# Patient Record
Sex: FEMALE | Race: BLACK OR AFRICAN AMERICAN | NOT HISPANIC OR LATINO | URBAN - METROPOLITAN AREA
[De-identification: names, ages, dates, MRNs, and addresses within clinical notes are randomized per-mention and may not be internally consistent; named-entity substitution may affect disease eponyms.]

---

## 2017-04-10 ENCOUNTER — OUTPATIENT (OUTPATIENT)
Dept: OUTPATIENT SERVICES | Facility: HOSPITAL | Age: 47
LOS: 1 days | Discharge: HOME | End: 2017-04-10

## 2017-06-27 DIAGNOSIS — D64.9 ANEMIA, UNSPECIFIED: ICD-10-CM

## 2017-08-01 ENCOUNTER — OUTPATIENT (OUTPATIENT)
Dept: OUTPATIENT SERVICES | Facility: HOSPITAL | Age: 47
LOS: 1 days | Discharge: HOME | End: 2017-08-01

## 2017-09-15 ENCOUNTER — OUTPATIENT (OUTPATIENT)
Dept: OUTPATIENT SERVICES | Facility: HOSPITAL | Age: 47
LOS: 1 days | Discharge: HOME | End: 2017-09-15

## 2017-09-15 DIAGNOSIS — Z02.9 ENCOUNTER FOR ADMINISTRATIVE EXAMINATIONS, UNSPECIFIED: ICD-10-CM

## 2017-09-15 DIAGNOSIS — Z11.1 ENCOUNTER FOR SCREENING FOR RESPIRATORY TUBERCULOSIS: ICD-10-CM

## 2017-11-07 ENCOUNTER — OUTPATIENT (OUTPATIENT)
Dept: OUTPATIENT SERVICES | Facility: HOSPITAL | Age: 47
LOS: 1 days | Discharge: HOME | End: 2017-11-07

## 2017-11-07 DIAGNOSIS — Z02.9 ENCOUNTER FOR ADMINISTRATIVE EXAMINATIONS, UNSPECIFIED: ICD-10-CM

## 2019-07-05 ENCOUNTER — EMERGENCY (EMERGENCY)
Facility: HOSPITAL | Age: 49
LOS: 0 days | Discharge: HOME | End: 2019-07-05
Admitting: EMERGENCY MEDICINE
Payer: COMMERCIAL

## 2019-07-05 VITALS
OXYGEN SATURATION: 99 % | RESPIRATION RATE: 18 BRPM | HEART RATE: 63 BPM | DIASTOLIC BLOOD PRESSURE: 65 MMHG | SYSTOLIC BLOOD PRESSURE: 125 MMHG | TEMPERATURE: 98 F

## 2019-07-05 VITALS — HEIGHT: 65 IN | WEIGHT: 149.91 LBS

## 2019-07-05 DIAGNOSIS — N61.0 MASTITIS WITHOUT ABSCESS: ICD-10-CM

## 2019-07-05 DIAGNOSIS — Z79.899 OTHER LONG TERM (CURRENT) DRUG THERAPY: ICD-10-CM

## 2019-07-05 DIAGNOSIS — N64.4 MASTODYNIA: ICD-10-CM

## 2019-07-05 PROCEDURE — 99283 EMERGENCY DEPT VISIT LOW MDM: CPT

## 2019-07-05 RX ORDER — IBUPROFEN 200 MG
1 TABLET ORAL
Qty: 15 | Refills: 0
Start: 2019-07-05 | End: 2019-07-09

## 2019-07-05 RX ORDER — CEPHALEXIN 500 MG
500 CAPSULE ORAL EVERY 12 HOURS
Refills: 0 | Status: DISCONTINUED | OUTPATIENT
Start: 2019-07-05 | End: 2019-07-05

## 2019-07-05 RX ORDER — CEPHALEXIN 500 MG
1 CAPSULE ORAL
Qty: 28 | Refills: 0
Start: 2019-07-05 | End: 2019-07-11

## 2019-07-05 RX ORDER — IBUPROFEN 200 MG
600 TABLET ORAL ONCE
Refills: 0 | Status: COMPLETED | OUTPATIENT
Start: 2019-07-05 | End: 2019-07-05

## 2019-07-05 RX ADMIN — Medication 500 MILLIGRAM(S): at 11:07

## 2019-07-05 RX ADMIN — Medication 600 MILLIGRAM(S): at 11:07

## 2019-07-05 NOTE — ED PROVIDER NOTE - PHYSICAL EXAMINATION
Gen: Alert, NAD, well appearing  Breast: (Chaperone by MS Shahid) Fibrocystic changes noted to breasts bilaterally, +2-cm cystic mobile structure palpable to 1 o'clock proximal to nipple with surrounding area of erythema, no nipple discharge  Pulm: Bilateral BS, normal resp effort, no wheeze/stridor/retractions  CV: RRR, no M/R/G, +dist pulses  Abd: soft, NT/ND  Neuro: AAOx3

## 2019-07-05 NOTE — ED PROVIDER NOTE - NSFOLLOWUPCLINICS_GEN_ALL_ED_FT
[facility]  Breast  [address], [address]  [place] [address]  Phone: [phone]  Fax:   Follow Up Time: 1-3 Days

## 2019-07-05 NOTE — ED PROVIDER NOTE - OBJECTIVE STATEMENT
Patient is a 48 years old F no pmhx presents to the ED for evaluation of left sided atraumatic breast pain that started yesterday. no fever no chills, LMP: Current, hx breast mammo two years ago and normal, no family history of breast cancer.

## 2019-07-05 NOTE — ED PROVIDER NOTE - CLINICAL SUMMARY MEDICAL DECISION MAKING FREE TEXT BOX
cellulitis of left breast, recommended warm compressed, abx and follow up with breast specialist discussed

## 2019-07-05 NOTE — ED PROVIDER NOTE - NS ED ROS FT
Review of Systems    Constitutional: (-) fever  Cardiovascular: (-) chest pain, (-) syncope  Respiratory: (-) cough, (-) shortness of breath  Gastrointestinal: (-) vomiting, (-) diarrhea  Musculoskeletal: (-) neck pain, (-) back pain, (-) joint pain  Integumentary: (-) rash

## 2019-07-08 PROBLEM — Z78.9 OTHER SPECIFIED HEALTH STATUS: Chronic | Status: ACTIVE | Noted: 2019-07-05

## 2019-07-10 PROBLEM — Z00.00 ENCOUNTER FOR PREVENTIVE HEALTH EXAMINATION: Status: ACTIVE | Noted: 2019-07-10

## 2019-07-22 ENCOUNTER — APPOINTMENT (OUTPATIENT)
Dept: BREAST CENTER | Facility: CLINIC | Age: 49
End: 2019-07-22
Payer: COMMERCIAL

## 2019-07-22 VITALS
TEMPERATURE: 98.6 F | SYSTOLIC BLOOD PRESSURE: 122 MMHG | HEIGHT: 66 IN | WEIGHT: 165 LBS | BODY MASS INDEX: 26.52 KG/M2 | DIASTOLIC BLOOD PRESSURE: 78 MMHG

## 2019-07-22 DIAGNOSIS — Z78.9 OTHER SPECIFIED HEALTH STATUS: ICD-10-CM

## 2019-07-22 DIAGNOSIS — R92.2 INCONCLUSIVE MAMMOGRAM: ICD-10-CM

## 2019-07-22 DIAGNOSIS — N61.1 ABSCESS OF THE BREAST AND NIPPLE: ICD-10-CM

## 2019-07-22 PROCEDURE — 99203 OFFICE O/P NEW LOW 30 MIN: CPT

## 2019-07-23 NOTE — HISTORY OF PRESENT ILLNESS
[FreeTextEntry1] : Quinn is a 48 premenopausal F who presents with a recent history of a left breast abscess/mastitis. \par \par Starting about 2-3 weeks prior, she noticed left breast pain with associated induration and erythema.  She presented to the ED at that time and was prescribed two different antibiotics.  She completed the antibiotics and reports resolution of her symptoms.  She still has occasional pain in her left breast, but denies any induration or erythema.  She denies any fevers or chills. \par \par She otherwise has no breast related complaints at this time.  She denies any breast pain, has not palpated any new palpable masses in either breast and denies any nipple discharge or retraction. She has never had any prior breast biopsies or surgeries and has never had a breast infection before.  She denies any insect bites or trauma to either breast. \par \par She has not had any recent imaging. \par \par HISTORICAL RISK FACTORS: \par -no prior breast biopsies or surgeries \par -no family history of breast or ovarian cancer \par -, age at first live birth was 19 \par -prior IUD, stopped 15 years prior \par

## 2019-07-23 NOTE — ASSESSMENT
[FreeTextEntry1] : Quinn is a 48 premenopausal F with a recent history of left breast mastitis. \par \par Her left breast mastitis has since resolved with a course of antibiotics.  \par On exam, there was no evidence of infections or undrainable abscesses.  She also did not have any suspicious lesions palpated in either breast.  Her last imaging was over two years prior and she cannot remember where she got the imaging performed.  She will be scheduled for a b/l dx mammogram and US.  I will have her follow up in 1 month for a CBE. \par \par She is otherwise at an average risk for breast cancer and should continue with annual screening mammograms. \par \par All of her questions were answered.  She knows to call with any further questions or concerns. \par \par PLAN: \par -b/l dx mammogram and US now\par -f/up in 1 month

## 2019-07-23 NOTE — PHYSICAL EXAM
[Normocephalic] : normocephalic [Atraumatic] : atraumatic [No Supraclavicular Adenopathy] : no supraclavicular adenopathy [No Cervical Adenopathy] : no cervical adenopathy [EOMI] : extra ocular movement intact [No dominant masses] : no dominant masses in right breast  [Examined in the supine and seated position] : examined in the supine and seated position [No dominant masses] : no dominant masses left breast [No Nipple Retraction] : no left nipple retraction [No Nipple Discharge] : no left nipple discharge [No Axillary Lymphadenopathy] : no left axillary lymphadenopathy [Not Tender] : non-tender [Soft] : abdomen soft [No Edema] : no edema [No Rashes] : no rashes [No Ulceration] : no ulceration [de-identified] : b/l nonspecific nodularities present throughout both breasts, but no suspicious masses palpated in either breast  [de-identified] : dense breast tissue in the UOQ, more pronounced on the right breast  [de-identified] : area of induration and erythema was @12-1:00, 3-4 cm FN, no suspicious masses palpated, no residual induration or erythema and drainable abscess collections; otherwise no other suspicious findings.

## 2019-07-23 NOTE — PAST MEDICAL HISTORY
[Menstruating] : The patient is menstruating [Menarche Age ____] : age at menarche was [unfilled] [Total Preg ___] : G[unfilled] [Age At Live Birth ___] : Age at live birth: [unfilled] [Live Births ___] : P[unfilled]  [History of Hormone Replacement Treatment] : has no history of hormone replacement treatment [FreeTextEntry5] : denies [FreeTextEntry6] : denies [FreeTextEntry7] : yes in past  [FreeTextEntry8] : yes x 1 year

## 2019-08-19 ENCOUNTER — FORM ENCOUNTER (OUTPATIENT)
Age: 49
End: 2019-08-19

## 2019-08-20 ENCOUNTER — OUTPATIENT (OUTPATIENT)
Dept: OUTPATIENT SERVICES | Facility: HOSPITAL | Age: 49
LOS: 1 days | Discharge: HOME | End: 2019-08-20
Payer: COMMERCIAL

## 2019-08-20 DIAGNOSIS — N64.53 RETRACTION OF NIPPLE: ICD-10-CM

## 2019-08-20 PROCEDURE — 77062 BREAST TOMOSYNTHESIS BI: CPT | Mod: 26

## 2019-08-20 PROCEDURE — 76641 ULTRASOUND BREAST COMPLETE: CPT | Mod: 26,50

## 2019-08-20 PROCEDURE — 77066 DX MAMMO INCL CAD BI: CPT | Mod: 26

## 2019-08-23 ENCOUNTER — EMERGENCY (EMERGENCY)
Facility: HOSPITAL | Age: 49
LOS: 0 days | Discharge: HOME | End: 2019-08-23
Admitting: EMERGENCY MEDICINE
Payer: COMMERCIAL

## 2019-08-23 VITALS
DIASTOLIC BLOOD PRESSURE: 62 MMHG | OXYGEN SATURATION: 100 % | WEIGHT: 163.36 LBS | TEMPERATURE: 98 F | HEART RATE: 95 BPM | SYSTOLIC BLOOD PRESSURE: 137 MMHG | RESPIRATION RATE: 17 BRPM

## 2019-08-23 DIAGNOSIS — M79.674 PAIN IN RIGHT TOE(S): ICD-10-CM

## 2019-08-23 DIAGNOSIS — M79.675 PAIN IN LEFT TOE(S): ICD-10-CM

## 2019-08-23 DIAGNOSIS — G56.00 CARPAL TUNNEL SYNDROME, UNSPECIFIED UPPER LIMB: ICD-10-CM

## 2019-08-23 PROCEDURE — 99282 EMERGENCY DEPT VISIT SF MDM: CPT

## 2019-08-23 NOTE — ED PROVIDER NOTE - NS ED ROS FT
GEN: (-) fever, (-)chills  NEURO: (-) weakness, (+) paresthesias  MS: (-) back pain, (+) joint pain, (-)myalgias, (-) swelling  SKIN: (-) rashes, (-) new lesions, (+) calluses.   HEME: (-) bleeding, (-) ecchymosis

## 2019-08-23 NOTE — ED PROVIDER NOTE - NSFOLLOWUPINSTRUCTIONS_ED_ALL_ED_FT
Carpal Tunnel Syndrome    WHAT YOU NEED TO KNOW:    What is carpal tunnel syndrome (CTS)? CTS is a condition that causes pressure to build in the carpal tunnel. The carpal tunnel is a small area between bones and tissues in your wrist. Swelling in this area puts pressure on the median nerve. The median nerve controls muscles and feeling in the hand.     What increases my risk for CTS?     Activities that use forceful or repetitive movement of your wrist and hand      A past or current wrist injury      Pregnancy      A health condition, such as diabetes, arthritis, or hypothyroidism       Obesity    What are the signs and symptoms of CTS?     Dull, sharp, or shooting pain in your hand      Numbness, tingling, or a burning feeling in your thumb, first finger, and middle finger      Arm pain that may extend to your shoulder      Weakness in your hand      Swelling in your hand      Not being able to control how your hand moves, or you drop objects    How is CTS diagnosed? Your healthcare provider will examine your hand and arm. He will ask how long you have had symptoms and what makes them worse. You may need any of the following:     Tests may be done to check for pressure on your nerve or to test how well your nerves are working. Your healthcare provider will tap, squeeze, press on, and gently move your wrist in different ways.       X-ray or MRI pictures may be used to look at the bones in your wrist and hand to find the cause of your symptoms. You may be given contrast liquid to help your wrist show up better in the pictures. Tell the healthcare provider if you have ever had an allergic reaction to contrast dye. Do not enter the MRI room with anything metal. Metal can cause serious injury. Tell healthcare providers if you have any metal in or on your body.     How is CTS treated? Your symptoms may get better without treatment. You may need any of the following if your symptoms continue or are severe:    NSAIDs may be recommended to decrease swelling and pain. NSAIDs are available without a doctor's order. Ask your healthcare provider which medicine is right for you and how much to take. Take as directed. NSAIDs can cause stomach bleeding or kidney problems if not taken correctly. If you take blood thinning medicine, always ask your healthcare provider if NSAIDs are safe for you.      Steroid injections may help decrease pain and swelling. Steroids are injected into the carpal tunnel.      Transcutaneous electric nerve stimulation uses mild electrical impulses to help decrease your wrist pain.      Surgery called decompression may be used to take pressure off of the median nerve in your wrist.     How can I manage my symptoms?     Apply ice to your wrist. Ice helps decrease swelling and pain in your wrist. Ice may also help prevent tissue damage. Use an ice pack, or put crushed ice in a plastic bag. Cover the ice pack with a towel. Place it on your wrist for 15 to 20 minutes every hour, or as directed.      Rest your hands. Let your hands rest for a short time between repetitive motions, such as typing. If you feel pain, stop what you are doing and gently massage your wrist and hand.      Get physical and occupational therapy, if directed. Physical therapists will show you ways to exercise and strengthen your wrist. Occupational therapists will show you safe ways to use your wrist while you do your usual activities.       Use a wrist splint as directed. A splint will keep your wrist straight or in a slightly bent position. A wrist splint decreases pressure on the median nerve by letting your wrist rest. You may need to wear the splint for up to 8 weeks. You may need to wear it at night.    When should I seek immediate care?     You suddenly lose feeling in your hand or fingers and you cannot move them.       Your hand suddenly changes color.    When should I contact my healthcare provider?     Your symptoms get worse.      Your hand and fingers are so weak that you cannot grab, squeeze, or lift items.      You have questions or concerns about your condition or care.    CARE AGREEMENT:    You have the right to help plan your care. Learn about your health condition and how it may be treated. Discuss treatment options with your healthcare providers to decide what care you want to receive. You always have the right to refuse treatment.

## 2019-08-23 NOTE — ED PROVIDER NOTE - OBJECTIVE STATEMENT
The pt is a 48y Female with no significant PMH is presenting to ED with toe pain x 1mo. pt states she has had worsening, bilateral, intermittent 5th digit toe pain. worse with wearing shoes, relieved without. associated with calluses to lateral aspects of 5th digit. pt denies trauma, swelling, redness, warmth, rashes, ecchymosis, decreased ROM, paresthesias. pt also endorsing bilateral wrist pain x 2mo associated with intermittent numbness to hands.

## 2019-08-23 NOTE — ED PROVIDER NOTE - CARE PROVIDER_API CALL
Tyler Arce)  Orthopaedic Surgery  [address] [place] [address]  Phone: [phone]  Fax: [phone]  Follow Up Time:     Lito Rubio (DPMARLY)  Surgery  [address] [place] [address]  Phone: [phone]  Fax: [phone]  Follow Up Time:

## 2019-08-23 NOTE — ED PROVIDER NOTE - PHYSICAL EXAMINATION
GEN: Alert & Oriented x 3, No acute distress. Calm, appropriate.  MS: Full ROM of bilateral feet/toes. No tenderness with palpation. Full ROM of bilateral wrists. (+) tinel's sign.   SKIN: calluses noted to lateral aspect of bilateral 5th digits of toes. no erythema, drainage or fluctuance.   NEURO: CN II-XII grossly intact. Strength + sensation intact x 4 extremities.

## 2019-08-23 NOTE — ED PROVIDER NOTE - CARE PROVIDERS DIRECT ADDRESSES
[email or web address]_Unknown

## 2020-10-04 NOTE — ED PROVIDER NOTE - CLINICAL SUMMARY MEDICAL DECISION MAKING FREE TEXT BOX
48y Female with wrist pain w/ (+) tinnels sign likely carpal tunnel will send for ortho follow up. Bilateral 5th digit pain, overlying calluses. no trauma, full ROM. discussed with patient about +/- imaging, pt opting for no xrays and will follow up with podiatry outpatient. I have discussed the discharge plan with the patient. The patient agrees with the plan, as discussed.  The patient understands Emergency Department diagnosis is a preliminary diagnosis often based on limited information and that the patient must adhere to the follow-up plan as discussed.  The patient understands that if the symptoms worsen or if prescribed medications do not have the desired/planned effect that the patient may return to the Emergency Department at any time for further evaluation and treatment. NAUSEA/PAIN/ABDOMINAL PAIN

## 2020-11-08 ENCOUNTER — EMERGENCY (EMERGENCY)
Facility: HOSPITAL | Age: 50
LOS: 0 days | Discharge: HOME | End: 2020-11-08
Attending: STUDENT IN AN ORGANIZED HEALTH CARE EDUCATION/TRAINING PROGRAM | Admitting: STUDENT IN AN ORGANIZED HEALTH CARE EDUCATION/TRAINING PROGRAM
Payer: COMMERCIAL

## 2020-11-08 VITALS
SYSTOLIC BLOOD PRESSURE: 133 MMHG | TEMPERATURE: 98 F | OXYGEN SATURATION: 100 % | DIASTOLIC BLOOD PRESSURE: 70 MMHG | RESPIRATION RATE: 18 BRPM | WEIGHT: 147.93 LBS | HEIGHT: 65 IN | HEART RATE: 60 BPM
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
## 2020-11-08 DIAGNOSIS — M25.519 PAIN IN UNSPECIFIED SHOULDER: ICD-10-CM

## 2020-11-08 DIAGNOSIS — Z79.2 LONG TERM (CURRENT) USE OF ANTIBIOTICS: ICD-10-CM

## 2020-11-08 DIAGNOSIS — Z79.1 LONG TERM (CURRENT) USE OF NON-STEROIDAL ANTI-INFLAMMATORIES (NSAID): ICD-10-CM
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
## 2020-11-08 DIAGNOSIS — M25.511 PAIN IN RIGHT SHOULDER: ICD-10-CM

## 2020-11-08 PROCEDURE — 99284 EMERGENCY DEPT VISIT MOD MDM: CPT

## 2020-11-08 PROCEDURE — 73030 X-RAY EXAM OF SHOULDER: CPT | Mod: 26,RT

## 2020-11-08 RX ORDER — IBUPROFEN 200 MG
600 TABLET ORAL ONCE
Refills: 0 | Status: COMPLETED | OUTPATIENT
Start: 2020-11-08 | End: 2020-11-08

## 2020-11-08 RX ADMIN — Medication 600 MILLIGRAM(S): at 09:44

## 2020-11-08 NOTE — ED PROVIDER NOTE - OBJECTIVE STATEMENT
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 I have pain with movement of my right shoulder." no CP/ SOB/ numbness/ tingling/ weakness

## 2020-11-08 NOTE — ED PROVIDER NOTE - CARE PROVIDER_API CALL
Linden Morrison  ORTHOPAEDIC SURGERY  [address] [place] [address]  Phone: [phone]  Fax: [phone]  Follow Up Time:

## 2020-11-08 NOTE — ED PROVIDER NOTE - NSFOLLOWUPCLINICS_GEN_ALL_ED_FT
[facility]  Orthpedic  [address] [place] [address], [place]   Phone: [phone]  Fax:   Follow Up Time:

## 2020-11-08 NOTE — ED PROVIDER NOTE - CLINICAL SUMMARY MEDICAL DECISION MAKING FREE TEXT BOX
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 n/v intact distally. NL ROM. shoulder xray neg for acute displaced frx or dislocation. Pt stable for dc w/ outpt f/up, and care as discussed.  Pt/ family understands plan and signs and symptoms for ED return.

## 2020-11-08 NOTE — ED PROVIDER NOTE - PATIENT PORTAL LINK FT
You can access the FollowMyHealth Patient Portal offered by [facility] by registering at the following website: [email or web address]. By joining [facility]’s FollowMyHealth portal, you will also be able to view your health information using other applications (apps) compatible with our system.

## 2020-11-27 ENCOUNTER — OUTPATIENT (OUTPATIENT)
Dept: OUTPATIENT SERVICES | Facility: HOSPITAL | Age: 50
LOS: 1 days | Discharge: HOME | End: 2020-11-27
Payer: COMMERCIAL

## 2020-11-27 DIAGNOSIS — Z12.31 ENCOUNTER FOR SCREENING MAMMOGRAM FOR MALIGNANT NEOPLASM OF BREAST: ICD-10-CM

## 2020-11-27 PROCEDURE — 77063 BREAST TOMOSYNTHESIS BI: CPT | Mod: 26

## 2020-11-27 PROCEDURE — 77067 SCR MAMMO BI INCL CAD: CPT | Mod: 26

## 2021-06-09 ENCOUNTER — OUTPATIENT (OUTPATIENT)
Dept: OUTPATIENT SERVICES | Facility: HOSPITAL | Age: 51
LOS: 1 days | Discharge: HOME | End: 2021-06-09
Payer: COMMERCIAL

## 2021-06-09 VITALS
OXYGEN SATURATION: 100 % | RESPIRATION RATE: 16 BRPM | HEART RATE: 76 BPM | WEIGHT: 151.9 LBS | DIASTOLIC BLOOD PRESSURE: 64 MMHG | HEIGHT: 67 IN | SYSTOLIC BLOOD PRESSURE: 116 MMHG | TEMPERATURE: 98 F

## 2021-06-09 DIAGNOSIS — L97.414 NON-PRESSURE CHRONIC ULCER OF RIGHT HEEL AND MIDFOOT WITH NECROSIS OF BONE: ICD-10-CM

## 2021-06-09 DIAGNOSIS — Z01.818 ENCOUNTER FOR OTHER PREPROCEDURAL EXAMINATION: ICD-10-CM

## 2021-06-09 LAB
ALBUMIN SERPL ELPH-MCNC: 4.4 G/DL — SIGNIFICANT CHANGE UP (ref 3.5–5.2)
ALP SERPL-CCNC: 72 U/L — SIGNIFICANT CHANGE UP (ref 30–115)
ALT FLD-CCNC: 18 U/L — SIGNIFICANT CHANGE UP (ref 0–41)
ANION GAP SERPL CALC-SCNC: 8 MMOL/L — SIGNIFICANT CHANGE UP (ref 7–14)
APPEARANCE UR: CLEAR — SIGNIFICANT CHANGE UP
AST SERPL-CCNC: 25 U/L — SIGNIFICANT CHANGE UP (ref 0–41)
BASOPHILS # BLD AUTO: 0.03 K/UL — SIGNIFICANT CHANGE UP (ref 0–0.2)
BASOPHILS NFR BLD AUTO: 0.7 % — SIGNIFICANT CHANGE UP (ref 0–1)
BILIRUB SERPL-MCNC: <0.2 MG/DL — SIGNIFICANT CHANGE UP (ref 0.2–1.2)
BILIRUB UR-MCNC: NEGATIVE — SIGNIFICANT CHANGE UP
BUN SERPL-MCNC: 22 MG/DL — HIGH (ref 10–20)
CALCIUM SERPL-MCNC: 9.5 MG/DL — SIGNIFICANT CHANGE UP (ref 8.5–10.1)
CHLORIDE SERPL-SCNC: 103 MMOL/L — SIGNIFICANT CHANGE UP (ref 98–110)
CO2 SERPL-SCNC: 30 MMOL/L — SIGNIFICANT CHANGE UP (ref 17–32)
COLOR SPEC: YELLOW — SIGNIFICANT CHANGE UP
CREAT SERPL-MCNC: 1 MG/DL — SIGNIFICANT CHANGE UP (ref 0.7–1.5)
DIFF PNL FLD: NEGATIVE — SIGNIFICANT CHANGE UP
EOSINOPHIL # BLD AUTO: 0.21 K/UL — SIGNIFICANT CHANGE UP (ref 0–0.7)
EOSINOPHIL NFR BLD AUTO: 4.6 % — SIGNIFICANT CHANGE UP (ref 0–8)
GLUCOSE SERPL-MCNC: 78 MG/DL — SIGNIFICANT CHANGE UP (ref 70–99)
GLUCOSE UR QL: NEGATIVE — SIGNIFICANT CHANGE UP
HCG SERPL-ACNC: <0.6 MIU/ML — SIGNIFICANT CHANGE UP
HCT VFR BLD CALC: 38.6 % — SIGNIFICANT CHANGE UP (ref 37–47)
HGB BLD-MCNC: 12.3 G/DL — SIGNIFICANT CHANGE UP (ref 12–16)
IMM GRANULOCYTES NFR BLD AUTO: 0.2 % — SIGNIFICANT CHANGE UP (ref 0.1–0.3)
KETONES UR-MCNC: NEGATIVE — SIGNIFICANT CHANGE UP
LEUKOCYTE ESTERASE UR-ACNC: NEGATIVE — SIGNIFICANT CHANGE UP
LYMPHOCYTES # BLD AUTO: 1.53 K/UL — SIGNIFICANT CHANGE UP (ref 1.2–3.4)
LYMPHOCYTES # BLD AUTO: 33.5 % — SIGNIFICANT CHANGE UP (ref 20.5–51.1)
MCHC RBC-ENTMCNC: 27.3 PG — SIGNIFICANT CHANGE UP (ref 27–31)
MCHC RBC-ENTMCNC: 31.9 G/DL — LOW (ref 32–37)
MCV RBC AUTO: 85.8 FL — SIGNIFICANT CHANGE UP (ref 81–99)
MONOCYTES # BLD AUTO: 0.39 K/UL — SIGNIFICANT CHANGE UP (ref 0.1–0.6)
MONOCYTES NFR BLD AUTO: 8.5 % — SIGNIFICANT CHANGE UP (ref 1.7–9.3)
NEUTROPHILS # BLD AUTO: 2.4 K/UL — SIGNIFICANT CHANGE UP (ref 1.4–6.5)
NEUTROPHILS NFR BLD AUTO: 52.5 % — SIGNIFICANT CHANGE UP (ref 42.2–75.2)
NITRITE UR-MCNC: NEGATIVE — SIGNIFICANT CHANGE UP
NRBC # BLD: 0 /100 WBCS — SIGNIFICANT CHANGE UP (ref 0–0)
PH UR: 7.5 — SIGNIFICANT CHANGE UP (ref 5–8)
PLATELET # BLD AUTO: 238 K/UL — SIGNIFICANT CHANGE UP (ref 130–400)
POTASSIUM SERPL-MCNC: 4.4 MMOL/L — SIGNIFICANT CHANGE UP (ref 3.5–5)
POTASSIUM SERPL-SCNC: 4.4 MMOL/L — SIGNIFICANT CHANGE UP (ref 3.5–5)
PROT SERPL-MCNC: 7.9 G/DL — SIGNIFICANT CHANGE UP (ref 6–8)
PROT UR-MCNC: SIGNIFICANT CHANGE UP
RBC # BLD: 4.5 M/UL — SIGNIFICANT CHANGE UP (ref 4.2–5.4)
RBC # FLD: 13.5 % — SIGNIFICANT CHANGE UP (ref 11.5–14.5)
SODIUM SERPL-SCNC: 141 MMOL/L — SIGNIFICANT CHANGE UP (ref 135–146)
SP GR SPEC: 1.03 — SIGNIFICANT CHANGE UP (ref 1.01–1.03)
UROBILINOGEN FLD QL: SIGNIFICANT CHANGE UP
WBC # BLD: 4.57 K/UL — LOW (ref 4.8–10.8)
WBC # FLD AUTO: 4.57 K/UL — LOW (ref 4.8–10.8)

## 2021-06-09 PROCEDURE — 93010 ELECTROCARDIOGRAM REPORT: CPT

## 2021-06-09 NOTE — H&P PST ADULT - HISTORY OF PRESENT ILLNESS
50 YR old female states has been having pain b/l feet for about 2yrs - "my toes are bent and toes have growth on them" presents to pretesting today- is scheduled for BILATERAL FOURTH AND FIFTH HAMMER TOE CORRECTION WITH MULTIPLE TENDON TENOTOMIES AND POSSIBLE HARDWARE FIXATION. Denies COVID S/S, did not receive COVID vaccines. Exercise мария 2 FOS.  Anesthesia Alert  NO--Difficult Airway  NO--History of neck surgery or radiation  NO--Limited ROM of neck  NO--History of Malignant hyperthermia  NO--Personal or family history of Pseudocholinesterase deficiency  NO--Prior Anesthesia Complication  NO--Latex Allergy  NO--Loose teeth  NO--History of Rheumatoid Arthritis  NO--PROSPER  No Bleeding risk  NO--Other_____

## 2021-06-12 ENCOUNTER — OUTPATIENT (OUTPATIENT)
Dept: OUTPATIENT SERVICES | Facility: HOSPITAL | Age: 51
LOS: 1 days | Discharge: HOME | End: 2021-06-12

## 2021-06-12 DIAGNOSIS — Z11.59 ENCOUNTER FOR SCREENING FOR OTHER VIRAL DISEASES: ICD-10-CM

## 2021-06-14 NOTE — ASU PATIENT PROFILE, ADULT - PATIENT'S HEIGHT AND WEIGHT RECORDED IN THE VITAL SIGNS FLOWSHEET
[de-identified] : Discussed at length the nature of the patient’s condition. PMHx of MS with right sided weakness. 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 I reviewed x-ray films with them. We also discussed the natural history, etiology, pathoanatomy, and treatment modalities of patellar maltracking and chondromalacia. \par \par I have suggested a home exercise program, and Orthotist drop lock and AFO bracing. Due to her effusion it may make fitting difficult, therefore, the right knee was aspirated as described above. When results are available, we will discuss further. Patient was given a right knee cortisone injection as detailed above for symptomatic relief. \par \par I have also recommended stretching of her heal cords, as she is beginning to develop an equinus contracture.\par \par She can continue activities as tolerated. All questions answered, understanding verbalized. Patient in agreement with plan of care. Patient may follow up with x-rays in prn. 
yes

## 2021-06-15 ENCOUNTER — RESULT REVIEW (OUTPATIENT)
Age: 51
End: 2021-06-15

## 2021-06-15 ENCOUNTER — OUTPATIENT (OUTPATIENT)
Dept: OUTPATIENT SERVICES | Facility: HOSPITAL | Age: 51
LOS: 1 days | Discharge: HOME | End: 2021-06-15
Payer: COMMERCIAL

## 2021-06-15 VITALS
TEMPERATURE: 97 F | SYSTOLIC BLOOD PRESSURE: 146 MMHG | RESPIRATION RATE: 17 BRPM | HEART RATE: 70 BPM | HEIGHT: 66 IN | OXYGEN SATURATION: 100 % | WEIGHT: 151.9 LBS | DIASTOLIC BLOOD PRESSURE: 65 MMHG

## 2021-06-15 VITALS — DIASTOLIC BLOOD PRESSURE: 88 MMHG | RESPIRATION RATE: 18 BRPM

## 2021-06-15 PROCEDURE — 88311 DECALCIFY TISSUE: CPT | Mod: 26

## 2021-06-15 PROCEDURE — 88304 TISSUE EXAM BY PATHOLOGIST: CPT | Mod: 26

## 2021-06-15 RX ORDER — SODIUM CHLORIDE 9 MG/ML
1000 INJECTION, SOLUTION INTRAVENOUS
Refills: 0 | Status: DISCONTINUED | OUTPATIENT
Start: 2021-06-15 | End: 2021-06-16

## 2021-06-15 RX ORDER — HYDROMORPHONE HYDROCHLORIDE 2 MG/ML
0.5 INJECTION INTRAMUSCULAR; INTRAVENOUS; SUBCUTANEOUS
Refills: 0 | Status: DISCONTINUED | OUTPATIENT
Start: 2021-06-15 | End: 2021-06-16

## 2021-06-15 RX ORDER — ONDANSETRON 8 MG/1
4 TABLET, FILM COATED ORAL ONCE
Refills: 0 | Status: DISCONTINUED | OUTPATIENT
Start: 2021-06-15 | End: 2021-06-16

## 2021-06-15 RX ORDER — HYDROMORPHONE HYDROCHLORIDE 2 MG/ML
1 INJECTION INTRAMUSCULAR; INTRAVENOUS; SUBCUTANEOUS
Refills: 0 | Status: DISCONTINUED | OUTPATIENT
Start: 2021-06-15 | End: 2021-06-16

## 2021-06-15 RX ADMIN — SODIUM CHLORIDE 100 MILLILITER(S): 9 INJECTION, SOLUTION INTRAVENOUS at 16:17

## 2021-06-15 NOTE — BRIEF OPERATIVE NOTE - NSICDXBRIEFPROCEDURE_GEN_ALL_CORE_FT
PROCEDURES:  Arthroplasty of toe of both feet 15-Eric-2021 15:43:49  Jennifer Chow  Percutaneous tenotomies of multiple tendons of toe 15-Eric-2021 15:44:06  Jennifer Chow

## 2021-06-15 NOTE — CHART NOTE - NSCHARTNOTEFT_GEN_A_CORE
PACU ANESTHESIA ADMISSION NOTE      Procedure: Bilateral 4TH & 5TH hammer toes correction with multiple tendon tenotomies  Post op diagnosis: Bilateral 4TH & 5TH hammer toes     ____  Intubated  TV:______       Rate: ______      FiO2: ______    __x  _x___  Full return of protective reflexes    ___x_  Full recovery from anesthesia / back to baseline status    Vitals:  T(C): 36.2 (06-15-21 @ 13:07), Max: 36.2 (06-15-21 @ 13:07)  HR: 70 (06-15-21 @ 13:07) (70 - 70)  BP: 146/65 (06-15-21 @ 13:07) (146/65 - 146/65)  RR: 17 (06-15-21 @ 13:07) (17 - 17)  SpO2: 100% (06-15-21 @ 13:07) (100% - 100%)    Mental Status:  ___x_ Awake   _x___ Alert   _____ Drowsy   _____ Sedated    Nausea/Vomiting:  ____ NO  ___x__Yes,   See Post - Op Orders          Pain Scale (0-10):  _____    Treatment: ____ None    _x___ See Post - Op/PCA Orders    Post - Operative Fluids:   ____ Oral   _x___ See Post - Op Orders    Plan: Discharge:   _x___Home       _____Floor     _____Critical Care    _____  Other:_________________    Comments: uneventful anesthesia course no complications. VItals stable. Pt transferred to PACU

## 2021-06-15 NOTE — ASU DISCHARGE PLAN (ADULT/PEDIATRIC) - CARE PROVIDER_API CALL
Lito Rubio  SURGERY  [address] [place] [address]  Phone: [phone]  Fax: [phone]  Established Patient  Follow Up Time:

## 2021-06-21 DIAGNOSIS — M20.41 OTHER HAMMER TOE(S) (ACQUIRED), RIGHT FOOT: ICD-10-CM

## 2021-06-21 DIAGNOSIS — M20.42 OTHER HAMMER TOE(S) (ACQUIRED), LEFT FOOT: ICD-10-CM

## 2021-06-23 LAB — SURGICAL PATHOLOGY STUDY: SIGNIFICANT CHANGE UP

## 2023-01-31 NOTE — H&P PST ADULT - GENITOURINARY
----- Message from Nydia Cruz sent at 1/30/2023  3:13 PM CST -----  Type:  Needs Medical Advice    Who Called:  pt  Symptoms (please be specific):    How long has patient had these symptoms:    Pharmacy name and phone #:    Would the patient rather a call back or a response via [employer]?   Best Call Back Number: [phone] (M)   Additional Information: would like a disability package forms to be mail        negative